# Patient Record
Sex: MALE | Race: WHITE
[De-identification: names, ages, dates, MRNs, and addresses within clinical notes are randomized per-mention and may not be internally consistent; named-entity substitution may affect disease eponyms.]

---

## 2018-03-13 ENCOUNTER — HOSPITAL ENCOUNTER (OUTPATIENT)
Dept: HOSPITAL 80 - FIMAGING | Age: 68
End: 2018-03-13
Attending: INTERNAL MEDICINE
Payer: COMMERCIAL

## 2018-03-13 DIAGNOSIS — N20.2: Primary | ICD-10-CM

## 2018-03-13 DIAGNOSIS — K57.30: ICD-10-CM

## 2018-04-20 ENCOUNTER — HOSPITAL ENCOUNTER (OUTPATIENT)
Dept: HOSPITAL 80 - FIMAGING | Age: 68
End: 2018-04-20
Attending: UROLOGY
Payer: COMMERCIAL

## 2018-04-20 DIAGNOSIS — N28.1: ICD-10-CM

## 2018-04-20 DIAGNOSIS — N20.0: Primary | ICD-10-CM

## 2018-09-05 ENCOUNTER — HOSPITAL ENCOUNTER (OUTPATIENT)
Dept: HOSPITAL 80 - FIMAGING | Age: 68
End: 2018-09-05
Attending: PSYCHIATRY & NEUROLOGY
Payer: COMMERCIAL

## 2018-09-05 DIAGNOSIS — G31.9: ICD-10-CM

## 2018-09-05 DIAGNOSIS — R90.82: Primary | ICD-10-CM

## 2018-09-28 ENCOUNTER — HOSPITAL ENCOUNTER (INPATIENT)
Dept: HOSPITAL 80 - FED | Age: 68
LOS: 2 days | Discharge: HOME | DRG: 63 | End: 2018-09-30
Attending: INTERNAL MEDICINE | Admitting: INTERNAL MEDICINE
Payer: COMMERCIAL

## 2018-09-28 DIAGNOSIS — G43.909: ICD-10-CM

## 2018-09-28 DIAGNOSIS — F41.9: ICD-10-CM

## 2018-09-28 DIAGNOSIS — R47.01: ICD-10-CM

## 2018-09-28 DIAGNOSIS — F03.90: ICD-10-CM

## 2018-09-28 DIAGNOSIS — I63.9: Primary | ICD-10-CM

## 2018-09-28 DIAGNOSIS — E86.9: ICD-10-CM

## 2018-09-28 DIAGNOSIS — N40.0: ICD-10-CM

## 2018-09-28 LAB
INR PPP: 0.93 (ref 0.83–1.16)
PLATELET # BLD: 222 10^3/UL (ref 150–400)
PROTHROMBIN TIME: 12.7 SEC (ref 12–15)

## 2018-09-28 PROCEDURE — G0378 HOSPITAL OBSERVATION PER HR: HCPCS

## 2018-09-28 RX ADMIN — OXYCODONE HYDROCHLORIDE AND ACETAMINOPHEN PRN TAB: 5; 325 TABLET ORAL at 20:56

## 2018-09-28 NOTE — ASMTLCPROG
Notes

 

Note:                         

Notes:

Spoke with patient about his anxiety. PT was manipulative talking about his anxiety and how poorly 

the [facility] has treated thim. TLC listened using a Rogerian Person centered 

approach, validated his concnerns and coached the pt with deep breathing exercises to help reduce 

anxiety as well as offered him the information to sign up with [facility] to become an open client and 

recieve counseling services. Pt was open to this, TLC dialed the screaning intake number for pt 

(since he was too far to reach the phone) and hand him the phone to set up his appt. PT was also 

given the [facility] resources sheet with the crisis lines, various centers and screening centers.

 

Date Signed:  09/28/2018 04:26 PM

Electronically Signed By:Ashwin Bearden

## 2018-09-28 NOTE — EDPHY
H & P


Stated Complaint: SOB, anxiety.


Time Seen by Provider: 09/28/18 14:56


HPI/ROS: 





CHIEF COMPLAINT:  Anxiety attack





HISTORY OF PRESENT ILLNESS:  The patient is a 68-year-old man who comes to the 

emergency department complaining of lightheadedness and anxiety attack after 

[place] [employer].  He states that he is placing a claim against 

[employer] and went to [facility] today to see if they had responded.  The 

lady who was helping him with a claim was not there.  He felt extremely 

disappointed and became lightheaded and did not feel that he could walk all the 

way back 15 blocks to his [facility] house where he is staying for history of 

substance abuse.  He reports that he has had lightheadedness off and on like 

this for several years and has had multiple cardiac workups all of which have 

been negative including recent navicular stress tests.  He also reports history 

of migraines and follows by Neurology with recent MRI which was negative.  He 

called EMS who brought him here.  A his symptoms he thinks are now resolved .


Severity:  Severe


Modifying factors  time





REVIEW OF SYSTEMS:


Constitutional:  denies: chills, fever, recent illness, recent injury


EENTM: denies: blurred vision, double vision, nose congestion


Respiratory: denies: cough, shortness of breath


Cardiac:  See HPI denies: chest pain, irregular heart rate, palpitations


Gastrointestinal/Abdominal: denies: abdominal pain, diarrhea, nausea, vomiting, 

blood streaked stools


Genitourinary: denies: dysuria, frequency, hematuria, pain


Musculoskeletal: denies: joint pain, muscle pain


Skin: denies: lesions, rash, jaundice, bruising


Neurological: denies: headache, numbness, paresthesia, tingling, dizziness, 

weakness


Hematologic/Lymphatic: denies: blood clots, easy bleeding, easy bruising


Immunologic/allergic: denies: HIV/AIDS, transplant


 10 systems reviewed and negative except as noted





EXAM:


GENERAL:  Well-appearing, well-nourished and in no acute distress.


HEAD:  Atraumatic, normocephalic.


EYES:  Pupils equal round and reactive to light, extraocular movements intact, 

sclera anicteric, conjunctiva are normal.


ENT:  TMs normal, nares patent, oropharynx clear without exudates.  Moist 

mucous membranes.


NECK:  Normal range of motion, supple without lymphadenopathy or JVD.


LUNGS:  Breath sounds clear to auscultation bilaterally and equal.  No wheezes 

rales or rhonchi.


HEART:  Regular rate and rhythm without murmurs, rubs or gallops.


ABDOMEN:  Soft, nontender, normoactive bowel sounds.  No guarding, no rebound.  

No masses appreciated. 


BACK:  No CVA tenderness, no spinal tenderness, step-offs or deformities


EXTREMITIES:  Normal range of motion, no pitting or edema.  No clubbing or 

cyanosis.


NEUROLOGICAL:  Cranial nerves II through XII grossly intact.  Normal speech, 

normal gait.  5/5 strength, normal movement in all extremities, normal sensation

, normal reflexes


PSYCH:  Normal mood, normal affect.


SKIN:  Warm, dry, normal turgor, no visible rashes or lesions.








Source: Patient


Exam Limitations: No limitations





- Personal History


Current Tetanus/Diphtheria Vaccine: Unsure


Current Tetanus Diphtheria and Acellular Pertussis (TDAP): Unsure





- Medical/Surgical History


Hx Asthma: No


Hx Chronic Respiratory Disease: No


Hx Diabetes: No


Hx Cardiac Disease: No


Hx Renal Disease: No


Hx Cirrhosis: No


Hx Alcoholism: No


Hx HIV/AIDS: No


Hx Splenectomy or Spleen Trauma: No


Other PMH: MIGRAINES, kidney stones,





- Family History


Significant Family History: No pertinent family hx





- Social History


Smoking Status: Never smoked


Alcohol Use: Sober


Drug Use: None


Constitutional: 


 Initial Vital Signs











Temperature (C)  36.6 C   09/28/18 14:41


 


Heart Rate  84   09/28/18 14:41


 


Respiratory Rate  18   09/28/18 14:41


 


Blood Pressure  151/81 H  09/28/18 14:41


 


O2 Sat (%)  97   09/28/18 14:41








 











O2 Delivery Mode               Room Air














Allergies/Adverse Reactions: 


 





morphine Allergy (Mild, Verified 09/28/18 19:18)


 Itching








Home Medications: 














 Medication  Instructions  Recorded


 


Ondansetron Odt [Zofran Odt 4 mg 8 mg PO TID PRN #10 tab 10/08/16





(*)]  


 


Sumatriptan Succinate [Imitrex] 100 mg PO DAILY PRN 09/28/18


 


Tamsulosin HCl [Flomax 0.4 MG (*)] 0.4 mg PO DAILY 09/28/18


 


oxyCODONE HCL/ACETAMINOPHEN 1 each PO QID PRN 09/28/18





[Percocet 5-325 mg Tablet]  














Medical Decision Making





- Diagnostics


EKG Interpretation: 





An EKG obtained and was read and documented in trace view.  Please see trace 

view for full reading and report.  Sinus rhythm, no acute ischemic changes 


Imaging Results: 


 Imaging Impressions





Head CT  09/28/18 16:44


Impression: No acute intracranial process.


 


Findings and recommendations discussed with CONY RODRIGUEZ  at 1658 hour, 9/28/ 2018.


 


A test result has been communicated to a licensed care provider and documented 

in the "[employer]" Critical Result system on 9/28/2018 16:59, Message ID 

[id number].








Head CTA  09/28/18 16:44


Impression: Small amount of atherosclerotic disease in the proximal right 

vertebral artery causing approximately 50% stenosis. Otherwise, normal CT 

angiogram of the head and neck.


 


Stenoses are calculated using North American Symptomatic Carotid Endarterectomy 

Trial (NASCET) criteria.


 


Findings and recommendations discussed with CONY RODRIGUEZ  at 1736 hour, 9/28/ 2018.


 


 


 








Neck CTA  09/28/18 16:44


Impression: Small amount of atherosclerotic disease in the proximal right 

vertebral artery causing approximately 50% stenosis. Otherwise, normal CT 

angiogram of the head and neck.


 


Stenoses are calculated using [employer] American Symptomatic Carotid Endarterectomy 

Trial (NASCET) criteria.


 


Findings and recommendations discussed with CONY RODRIGUEZ  at 1736 hour, 9/28/ 2018.


 


 


 











Imaging: Discussed imaging studies w/ On call Radiologist


ED Course/Re-evaluation: 





We had a long discussion with the patient about his chronic symptoms.  He is 

now asymptomatic.  I offered to perform lab work and imaging.  His EKG is 

reassuring as are his vital signs and exam.  He felt that repeat lab work was 

unlikely to be diagnostic and I agreed.  I expressed my concern that much of 

his symptoms seem to be stress related or emotionally driven.  He agreed.  He 

elected to be discharged.  He does have a  at the [facility] [place].  I 

will ask [facility] to give him resources for other therapeutic options.





4:15 p.m. [facility] has given the patient resources and he is currently on the phone 

making an appointment with them.





4:45 p.m. while being discharged the patient was displaying difficulty with word

-finding and seemed to have some confusion with following commands.  It is 

difficult to assess.  I suspect that this is part of his anxiety reaction but 

we have called a stroke alert.  I will also administer Ativan.  he is moving 

all of his extremities.  When you ask him if he thinks this is part of his 

anxiety reaction he says yes but then continues to stumble over words and 

cannot explain himself.  No slurred speech.  No facial droop.





5:40 p.m. I discussed the case with [facility].  They are also on the 

fence about whether not this represents true ischemia but do recommend giving 

tPA. We will give tPA and admit to the ICU.





6:00 p.m. I discussed the case with Dr. Stacy Pino who will admit to the 

ICU.


Differential Diagnosis: 





Partial list of the Differential diagnosis considered include but were not 

limited to;  anxiety, arrhythmia, dehydration and although unlikely based on 

the history and physical exam, I also considered acute coronary disease, CVA, 

seizure.  I discussed these differential diagnoses and the plan with the 

patient as well as the usual and expected course.  The patient understands that 

the diagnosis is provisional and that in medicine we are not always correct and 

that further workup is often warranted.  Usual and customary warnings were 

given.  All of the patient's questions were answered.  The patient was 

instructed to return to the emergency department should the symptoms at all 

worsen or return, otherwise to followup with the physician as we discussed.


Critical Care Time: 





Critical care time spent by me, Dr. Rodriguez exclusive with this patient was 45 

minutes, exclusive of the PA time exclusive of procedures.  The organ system 

that was at risk was diagnostics and I gave medications, consultation and 

admission to prevent worsening of the patient's condition





- Data Points


Laboratory Results: 


 Laboratory Results





 09/28/18 15:35 





 09/28/18 15:35 





 











  09/28/18 09/28/18 09/28/18





  16:52 15:35 15:35


 


WBC      





    


 


RBC      





    


 


Hgb      





    


 


POC Hgb  16.3 gm/dL gm/dL    





   (13.7-17.5)   


 


Hct      





    


 


POC Hct  48 % %    





   (40-51)   


 


MCV      





    


 


MCH      





    


 


MCHC      





    


 


RDW      





    


 


Plt Count      





    


 


MPV      





    


 


Neut % (Auto)      





    


 


Lymph % (Auto)      





    


 


Mono % (Auto)      





    


 


Eos % (Auto)      





    


 


Baso % (Auto)      





    


 


Nucleat RBC Rel Count      





    


 


Absolute Neuts (auto)      





    


 


Absolute Lymphs (auto)      





    


 


Absolute Monos (auto)      





    


 


Absolute Eos (auto)      





    


 


Absolute Basos (auto)      





    


 


Absolute Nucleated RBC      





    


 


Immature Gran %      





    


 


Immature Gran #      





    


 


PT      12.7 SEC SEC





     (12.0-15.0) 


 


INR      0.93 





     (0.83-1.16) 


 


APTT      27.7 SEC SEC





     (23.0-38.0) 


 


POC Sodium  143 mEq/L mEq/L    





   (135-145)   


 


Sodium    141 mEq/L mEq/L  





    (135-145)  


 


POC Potassium  3.3 mEq/L mEq/L    





   (3.3-5.0)   


 


Potassium    4.1 mEq/L mEq/L  





    (3.3-5.0)  


 


POC Chloride  106 mEq/L mEq/L    





   ()   


 


Chloride    107 mEq/L mEq/L  





    ()  


 


Carbon Dioxide    22 mEq/l mEq/l  





    (22-31)  


 


Anion Gap    12 mEq/L mEq/L  





    (8-16)  


 


POC BUN  15 mg/dL mg/dL    





   (7-23)   


 


BUN    16 mg/dL mg/dL  





    (7-23)  


 


Creatinine    1.1 mg/dL mg/dL  





    (0.7-1.3)  


 


POC Creatinine  1.0 mg/dL mg/dL    





   (0.7-1.3)   


 


Estimated GFR    > 60   





    


 


Glucose    78 mg/dL mg/dL  





    ()  


 


POC Glucose  95 mg/dL mg/dL    





   ()   


 


Calcium    9.9 mg/dL mg/dL  





    (8.5-10.4)  














  09/28/18





  15:35


 


WBC  8.95 10^3/uL 10^3/uL





   (3.80-9.50) 


 


RBC  5.27 10^6/uL 10^6/uL





   (4.40-6.38) 


 


Hgb  16.5 g/dL g/dL





   (13.7-17.5) 


 


POC Hgb  





  


 


Hct  47.2 % %





   (40.0-51.0) 


 


POC Hct  





  


 


MCV  89.6 fL fL





   (81.5-99.8) 


 


MCH  31.3 pg pg





   (27.9-34.1) 


 


MCHC  35.0 g/dL g/dL





   (32.4-36.7) 


 


RDW  12.2 % %





   (11.5-15.2) 


 


Plt Count  222 10^3/uL 10^3/uL





   (150-400) 


 


MPV  10.5 fL fL





   (8.7-11.7) 


 


Neut % (Auto)  59.4 % %





   (39.3-74.2) 


 


Lymph % (Auto)  28.2 % %





   (15.0-45.0) 


 


Mono % (Auto)  10.6 % %





   (4.5-13.0) 


 


Eos % (Auto)  0.8 % %





   (0.6-7.6) 


 


Baso % (Auto)  0.6 % %





   (0.3-1.7) 


 


Nucleat RBC Rel Count  0.0 % %





   (0.0-0.2) 


 


Absolute Neuts (auto)  5.32 10^3/uL 10^3/uL





   (1.70-6.50) 


 


Absolute Lymphs (auto)  2.52 10^3/uL 10^3/uL





   (1.00-3.00) 


 


Absolute Monos (auto)  0.95 10^3/uL H 10^3/uL





   (0.30-0.80) 


 


Absolute Eos (auto)  0.07 10^3/uL 10^3/uL





   (0.03-0.40) 


 


Absolute Basos (auto)  0.05 10^3/uL 10^3/uL





   (0.02-0.10) 


 


Absolute Nucleated RBC  0.00 10^3/uL 10^3/uL





   (0-0.01) 


 


Immature Gran %  0.4 % %





   (0.0-1.1) 


 


Immature Gran #  0.04 10^3/uL 10^3/uL





   (0.00-0.10) 


 


PT  





  


 


INR  





  


 


APTT  





  


 


POC Sodium  





  


 


Sodium  





  


 


POC Potassium  





  


 


Potassium  





  


 


POC Chloride  





  


 


Chloride  





  


 


Carbon Dioxide  





  


 


Anion Gap  





  


 


POC BUN  





  


 


BUN  





  


 


Creatinine  





  


 


POC Creatinine  





  


 


Estimated GFR  





  


 


Glucose  





  


 


POC Glucose  





  


 


Calcium  





  











Medications Given: 


 





Sodium Chloride (Ns)  1,000 mls @ 100 mls/hr IV CONT IDALIA


   Stop: 03/27/19 20:29


   Last Admin: 09/28/18 20:57 Dose:  1,000 mls


Oxycodone/Acetaminophen (Percocet 5/325)  1 tab PO QID PRN


   PRN Reason: Pain, Moderate


   Stop: 10/08/18 20:16


   Last Admin: 09/28/18 20:56 Dose:  1 tab





Discontinued Medications





Alteplase, Recombinant (Activase)  8.40591 mg 0.09 mg/kg (8.31043 mg) IV ONCE 

ONE


   PRN Reason: Protocol


   Stop: 09/28/18 17:40


   Last Admin: 09/28/18 17:51 Dose:  8.19219 mg


Alteplase, Recombinant (Activase)  78.15747 mg 0.81 mg/kg (78.03765 mg) IV ONCE 

ONE


   PRN Reason: Protocol


   Stop: 09/28/18 17:40


   Last Admin: 09/28/18 17:52 Dose:  78.55481 mg


Sodium Chloride (Ns)  1,000 mls @ 0 mls/hr IV EDNOW ONE; Wide Open


   PRN Reason: Protocol


   Stop: 09/28/18 15:03


   Last Admin: 09/28/18 15:22 Dose:  Not Given


Sodium Chloride (Ns)  50 mls @ 0 mls/hr IV EDNOW ONE


   PRN Reason: Per Protocol


   Stop: 09/28/18 17:40


   Last Admin: 09/28/18 17:53 Dose:  50 mls


Lorazepam (Ativan Injection)  0.5 mg IVP EDNOW ONE


   Stop: 09/28/18 15:03


   Last Admin: 09/28/18 15:22 Dose:  Not Given


Lorazepam (Ativan Injection)  1 mg IVP EDNOW ONE


   Stop: 09/28/18 16:46


   Last Admin: 09/28/18 17:01 Dose:  1 mg





Point of Care Test Results: 


 Chemistry











  09/28/18





  16:52


 


POC Sodium  143 mEq/L mEq/L





   (135-145) 


 


POC Potassium  3.3 mEq/L mEq/L





   (3.3-5.0) 


 


POC Chloride  106 mEq/L mEq/L





   () 


 


POC BUN  15 mg/dL mg/dL





   (7-23) 


 


POC Creatinine  1.0 mg/dL mg/dL





   (0.7-1.3) 


 


POC Glucose  95 mg/dL mg/dL





   () 








 ISTAT H&H











  09/28/18





  16:52


 


POC Hgb  16.3 gm/dL gm/dL





   (13.7-17.5) 


 


POC Hct  48 % %





   (40-51) 














Departure





- Departure


Disposition: [facility]


Clinical Impression: 


 Anxiety, Trouble with word finding





Condition: Critical

## 2018-09-28 NOTE — [facility]
DATE OF ADMISSION:  09/28/2018



CHIEF COMPLAINT:  Aphasia.



HISTORY:  Brandon is a 68-year-old male who lives in a intermediate house for a history of cocaine abuse, alt
aide he has not used for 10 months.  Today he walked 15 blocks from his intermediate house for the courth
ouse and got very lightheaded with this.  He got short of breath and the more he tried to take deep b
reaths the more short of breath he would get.  He describes himself as being extremely anxious.  He p
resented to the emergency room.  The ER physician thought this was likely anxiety related and they we
re planning to discharge him from the emergency room.  While he was getting discharge instructions he
 suddenly had the acute onset of aphasia. He had word-finding difficulty and confusion and a stroke a
lert was called.  [facility] evaluated him and recommended tPA.  The patient's word-finding d
ifficulty is now completely resolved.  The patient claims to have no memory of the event and does not
 remember having all of this occur.  He describes a history of executive cognitive function loss for 
which he is seeing a neuropsychologist, Dr. Beckham.  He states he frequently will have these transient
 episodes of aphasia related to this cognitive decline.



PAST MEDICAL HISTORY:  

1.  Migraine headaches.

2.  BPH.

3.  Kidney stones.

4.  Dementia, which he describes as executive cognitive function loss.  This was diagnosed by his joanne
ropsychologist Dr. Beckham.

5.  Anxiety.



MEDICATIONS:  Please see the computer record for a full detailed list.



ALLERGIES:  To morphine.



SOCIAL HISTORY:  Quit smoking at age 29.  Drinks very little alcohol.  In a intermediate house for history
 of cocaine abuse for which he has been clean for 10 months.  Previously worked in retail management.




REVIEW OF SYSTEMS:  Complete review of systems obtained.  Review of systems negative for constitution
al, HEENT, GI, pulmonary, cardiovascular, , hematology, skin, endocrine, psychiatric, except for po
sitives as in HPI.



FAMILY HISTORY:  Reviewed and noncontributory to presenting complaint.



PHYSICAL EXAMINATION:  GENERAL:  Well-developed, well-nourished male in no distress.  VITALS:  Temper
ature 36.5, pulse 91, blood pressure 146/90, saturating 90% on 2 L.  EYES:  Normal conjunctivae.  Pup
ils react to light.  ENT:  Normal ears and nose.  Hearing intact.  Normal teeth.  Oropharynx moist.  
NECK:  Trachea midline.  No thyromegaly.  CHEST:  Normal __________.  Chest rise bilaterally.  CARDIO
VASCULAR SYSTEM:  Regular rhythm.  No murmur.  EXTREMITIES:  No extremity edema.  ABDOMEN:  Soft, non
tender.  No hepatosplenomegaly.  SKIN:  Warm, dry, intact.  No rash.  MUSCULOSKELETAL:  No cyanosis o
r clubbing.  Strength 5/5, upper and lower extremities.  NEURO:  Cranial nerves intact.  Normal sensa
tion to light touch. PSYCHIATRIC:  Alert and oriented x3.  Normal affect.  Normal judgment.  Normal m
[place].



LABORATORIES:  White count 8.95, hematocrit 47.2, platelets 222.  Sodium 141, potassium 4.1, chloride
 107, chloride 22, BUN 16, creatinine 1.1, glucose 78.  Troponins negative.  EKG reviewed by me.  My 
personal interpretation is normal sinus rhythm, no ST-T wave changes.  Head CT is negative.  CT angio
gram of the head and neck shows 50% vertebral stenosis.  This case was discussed with Dr. Morgan, [facility] emergency room physician, regarding the ER course.



ASSESSMENT AND PLAN:  

1.  Possible stroke status post tPA, although there is some concern by the emergency room physician t
tracey this may have actually been an anxiety reaction, but [facility] recommended tPA based on their exam
ination during the event and it was administered.  According to the patient he has frequent, transien
t aphasic episodes which he attributes to this loss of executive cognitive function he has been diagn
osed with.  Will monitor in ICU with post-tPA protocol.  Check an echocardiogram and lipids.  Will co
nsult Neurology in the morning. 

2.  Anxiety.  The patient feels very strongly that much of his presentation today was an anxiety reac
tion.  He is requesting a referral to [facility]. 

3.  Dementia, being described as decreased executive cognitive functioning as discussed above.  

4.  Benign prostatic hypertrophy.  Continue Flomax.

5.  Code status, full.

6.  Admission status.  Will admit to observation.  Reevaluate tomorrow regarding the ongoing need for
 hospitalization.

7.  Deep vein thrombosis prophylaxis.  He is low risk post-tPA.





Job #:  [id number]

## 2018-09-28 NOTE — CPEKG
Test Reason : OPEN

Blood Pressure : ***/*** mmHG

Vent. Rate : 070 BPM     Atrial Rate : 071 BPM

   P-R Int : 159 ms          QRS Dur : 100 ms

    QT Int : 417 ms       P-R-T Axes : 038 018 014 degrees

   QTc Int : 450 ms

 

Sinus rhythm

 

Confirmed by Bennett Morgan ([id number]) on 9/28/2018 3:12:56 PM

 

Referred By:             Confirmed By:Bennett Morgan

## 2018-09-29 RX ADMIN — ACETAMINOPHEN SCH MG: 500 TABLET ORAL at 10:29

## 2018-09-29 RX ADMIN — ACETAMINOPHEN SCH: 500 TABLET ORAL at 17:34

## 2018-09-29 RX ADMIN — TAMSULOSIN HYDROCHLORIDE SCH MG: 0.4 CAPSULE ORAL at 10:30

## 2018-09-29 RX ADMIN — ACETAMINOPHEN SCH MG: 500 TABLET ORAL at 21:30

## 2018-09-29 NOTE — GCON
INTENSIVIST CONSULTATION



REASON FOR ADMISSION:  Possible stroke, status post tPA.  The patient is a very pleasant 68-year-old 
white male with a past medical history of dementia, anxiety, kidney stones, benign prostatic hypertro
phy, and migraine headaches.  He is a previous drug user and currently resides at a nursing home house.  H
marcellus became lightheaded and short of breath, which caused him extreme anxiety.  He was brought to the 
ergency room, where he was being discharged when he was found to have a sudden onset of aphasia and w
ord-finding difficulty.  [facility] was consulted, and they recommended tPA, which he was giv
en.  He was subsequently admitted to the intensive care unit.  His aphasia has resolved.  In discussi
on with the patient, he states overall he feels quite well.  He denies any chest pain, pleuritic-type
 chest pain, or anginal equivalent.  There is no fever or night sweats.  He denies any nausea, vomiti
ng, or diarrhea.  His headaches are improved.



PAST MEDICAL HISTORY:  Significant for anxiety, dementia, kidney stones, benign prostatic hypertrophy
, and migraines.



ALLERGIES:  Morphine.



SOCIAL HISTORY:  Previous smoker, none since age 29.  He denies any alcohol use.  He is currently in 
a nursing home house for cocaine abuse.  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  He is singl
e, without children.  He has lived in [place] for many years, is originally from [place].



FAMILY HISTORY:  Noncontributory.



REVIEW OF SYSTEMS:  A 10-point review of systems was performed and was negative except for what is li
stepasha in HPI.



PHYSICAL EXAM:  VITAL SIGNS:  Blood pressure is 101/58, pulse 72, respirations 15, temperature 37.0, 
oxygen saturation 95% on room air.  GENERAL:  He is a well-developed, well-nourished 68-year-old whit
e male, who is resting comfortably, in no acute distress.  HEENT:  Eyes PERRLA, EOMI.  Throat shows n
o erythema or tonsilar hypertrophy.  NECK:  Supple.  No cervical adenopathy.  HEART:  Regular rate an
d rhythm, without murmurs, rubs, or gallops.  LUNGS:  Diminished breath sounds, but no wheeze.  ABDOM
EN:  Soft, nontender.  Bowel sounds are present in all 4 quadrants.  EXTREMITIES:  No clubbing, cyano
sis, or edema.



LABORATORIES:  White count is 8.9, hemoglobin 16, hematocrit 47, platelet count is 222.  INR is 0.93.
  Sodium 143, potassium 3.3, chloride 106, CO2 is 22, BUN 15, creatinine 1.1, glucose is 78.



IMPRESSION:  

1.  Possible stroke, status post tissue plasminogen activator.

2.  Expressive aphasia is resolved.

3.  History of migraine headaches.

4.  Dementia.

5.  Anxiety.

6.  Benign prostatic hypertrophy.

7.  Kidney stones.



RECOMMENDATIONS:  

1.  Close neurologic and cardiovascular monitoring.

2.  DVT and PE prophylaxis.

3.  Stress ulcer prophylaxis.

4.  PT and OT.

5.  Speech.





Job #:  [id number]

## 2018-09-29 NOTE — HOSPPROG
Hospitalist Progress Note


Assessment/Plan: 





Expressive aphasia / possible CVA s/p TpA - NIHSS 0.  


   -repeat CT this evening.  If neg, start ASA 81 mg daily


   -neurology consult appreciated, doubts true ischemic event





Migraine ha - pt received benadryl and phenergan and is now resting comfortably


   -f/u with neurology for new preventive medicine





Anxiety - prn ativan while here, but will not d/c with bzd Rx due to risk of 

medication overuse


   -outpt f/u with pcp to consider initiation of ssri or other preventive 

options





Full code





Dispo - likely home in am


Subjective: Pt is resting comfortably after receiving phenergan and benadryl 

for migraine ha. No fevers/chills.  No neurologic deficits.


Objective: 


 Vital Signs











Temp Pulse Resp BP Pulse Ox


 


 37.0 C   58 L  17   109/57 L  98 


 


 09/29/18 10:00  09/29/18 12:00  09/29/18 12:00  09/29/18 12:00  09/29/18 12:00








 











 09/28/18 09/29/18 09/30/18





 05:59 05:59 05:59


 


Intake Total  2957 


 


Output Total  1150 


 


Balance  1807 








 











PT  12.7 SEC (12.0-15.0)   09/28/18  15:35    


 


INR  0.93  (0.83-1.16)   09/28/18  15:35    














- Physical Exam


Constitutional: no apparent distress


Cardiovascular: regular rate and rhythym


Respiratory: no respiratory distress


Neurologic: AAOx3





ICD10 Worksheet


Patient Problems: 


 Problems











Problem Status Onset


 


Anxiety Acute  


 


Migraine with status migrainosus Acute

## 2018-09-29 NOTE — ECHO
[email or web address]





[address] [address] 

Main: [phone] 



Fax: 



Transthoracic Echocardiogram 

Name:             EVERTON MORTON                             MR#:

[id number]

Study Date:       2018                              Study Time:

11:15 AM

YOB: 1950                              Age:

68 year(s)

Height:           180.3 cm (71 in.)                       Weight:

95.26 kg (210 lb.)

BSA:              2.15 m2                                 Gender:

Male

Examination:      Echo                                    Indication:

Ischemic Stroke

Image Quality:    Adequate                                Contrast: 

Requested by:     Stacy Pino                          BP:

105 mmHg/51 mmHg

Heart Rate:                                               Rhythm: 

Indication:       Ischemic Stroke 



Procedure Staff 

Ultrasound Technician:   Tiffanie Arceo [facility] 

Reading Physician:       Fawn Carr MD 

Requesting Provider: 



Conclusions:           Normal size left ventricle.  

Mild concentric LV hypertrophy.  

Normal global systolic LV function.  

The ejection fraction is visually estimated to be 55 %.  

No regional wall motion abnormality.  

Normal diastolic LV function.  

Mildly dilated right ventricle.  

Mildly reduced RV function.  

An agitated saline study was performed and was negative for

intracardiac shunting.

The right atrium is mildly dilated.  

Mild mitral valve regurgitation is present.  

Mild tricuspid regurgitation is present.  

Right ventricular systolic pressure measures 37mmHg.  

No pericardial effusion.  

There is no previous echocardiogram for comparison. 

No obvious cardiac source of embolism seen on this study. TERE is more

sensitive to detect cardiac

source of embolism. 



Measurements: 

Chambers                    Valvular Assessment AV/MV

Valvular Assessment TV/PV



Normal                                    Normal

Normal

Name         Value     Range              Name         Value Range

Name           Value Range

Ao Kelsey (2D): 3.1 cm    (1.4 cm-2.6            AV Vmax:     1.20 m/s (1

m/s-1.7        TR Vmax:       2.81 mm/s ( - )



cm)                                   m/s)             TR PGmax:

32 mmHg ( - )

IVSd (2D):   1.1 cm (0.6 cm-1.1               AV maxP mmHg ( -

)           syst. PAP: 37 mmHg  ( - )



cm)                AV meanP mmHg ( - )           PV Vmax:

1.32 m/s (0.6 m/s-0.9

LVDd (2D):   5.1 cm    (4.2 cm-5.9            MARQUITA (VTI):   2.7 cm ( -

)                               m/s)



cm)                MV E Vmax:   0.58 m/s ( - )         PV PGmax:

7  mmHg ( - )

LVDs (2D):   3.3 cm    (2.1 cm-4              MV A Vmax:   0.62 m/s (

- )



cm)                MV E/A:      0.94  ( - )  



Patient: EVERTON MORTON                          MRN: [id number]

Study Date: 2018   Page 1 of 3

11:15 AM 









LVPWd (2D): 1.1 cm (0.6 cm-1 MV PHT: 0.058 s ( - )  

cm)                MVA (PHT):   3.8 s ( - )  



LVOTd        2.1 cm    2.1 cm mm 

LVEF (BP):   52 %      (>=55 %)   

Visual EF:   55 % 

RVDd(2D):    3.8 cm    (1.9 cm-3.8 



cmmm)  



Continued Measurements: 

Chambers                      Valvular Assessment AV/MV

Valvular Assessment TV/PV



Name                       Value          Name

Value    Name                      Value

LADs:                    3.8 cm               MV DecTime:

194 m/s      CVP (est.):              5 mmHg

LADs Lon.2 cm               MV E' Septal:

0.07  m/s

LA Area:                 17.1 cm2         MV E/E' Septal:        8.50



LA Volume:               50 ml            MV E/E' Lateral:       5.30



LA Volume Index:         23.3 ml/m2   

RA Area:                 20.2 cm2   



Additional Vessels  



Name                       Value  

Ao Ascending:            3.2 cm    



Findings:              Left Ventricle: 

Normal size left ventricle. Mild concentric LV hypertrophy. Normal

global systolic LV function. The

ejection fraction is visually estimated to be 55 %. No regional wall

motion abnormality. Normal

diastolic LV function.  

Right Ventricle: 

Mildly dilated right ventricle. Mildly reduced RV function.  

Left Atrium: 

The left atrium is normal in size. An agitated saline study was

performed and was negative for

intracardiac shunting.  

Right Atrium: 

The right atrium is mildly dilated.  

Mitral Valve: 

The mitral valve is normal in appearance and function. Mild mitral

valve regurgitation is present. No

mitral stenosis is present.  

Aortic Valve: 

The aortic valve is tri-leaflet. There is no significant aortic valve

regurgitation. No aortic valve

stenosis is present.  

Tricuspid Valve: 

The tricuspid valve is normal in appearance and function. Mild

tricuspid regurgitation is present. The

pulmonary artery pressure is normal. Right ventricular systolic

pressure measures 37mmHg.

Pulmonic Valve: 

The pulmonic valve is normal in appearance and function.  

Aorta: 

The aorta is normal. Normal size aortic root measuring 3.1 cm. Normal

size ascending aorta

measuring 3.2 cm.  

IVC: 

Difficult visualization of subcostals.  

Pericardium: 

No pericardial effusion. There is pericardial fat. No pleural

effusion.







Electronically signed by Fawn Carr MD on 2018 at 01:34 PM 

(No Signature Object) 



Patient: EVERTON MORTON                          MRN: [id number]

Study Date: 2018   Page 2 of 3

11:15 AM 









Patient: EVERTON MORTON MRN: [id number] Study Date: 2018 Page 3 of

3

11:15 AM 







[id number]

## 2018-09-29 NOTE — ASMTCMCOM
CM Note

 

CM Note                       

Notes:

I attempted to meet with patient various times, but he was sleeping. Per chart review, it seems 

that he's living at the [facility]. In the ED, he was supported by [facility] 

regarding his desire to connect with [facility]. Case Management will follow up with 

him to see if he was able to schedule an intake.



Patient is on bedrest until tonight; tomorrow PT/OT will evaluate him. I imagine he will be cleared 


and may discharge independently back to the long-term [place]. Case Management will follow. 

 

Date Signed:  09/29/2018 02:54 PM

Electronically Signed By:Vonda Orozco RN

## 2018-09-29 NOTE — GHP
DATE OF ADMISSION:  09/28/2018



HISTORY:  The patient is a 68-year-old gentleman who I have known for many years in the office since 
2011.  He has a 30 year history of intractable headaches and has been on all standard migraine prophy
lactic agents over the years without sustained benefit and has had overuse of sumatriptan for many ye
ars, but it has been the strategy that has given him reasonable relief of headaches.  Yesterday, [place] and started experiencing some lightheadedness and anxiety and says that he
 was not sure what was happening, but came to the hospital for evaluation.  He was thought to be havi
ng probable anxiety or panic attack and was going to be discharged when he became aphasic which led t
o a stroke alert considerations and tPA via [facility].  His CT angiogram was unremarkabl
e for any large vessel stenosis or embolus evidence and CT was unremarkable as well.  We have followe
d him over the years with cognitive questions and repeat neuropsych testing has shown some mild cogni
tive impairment or normal aging with complex disease related to his medications and/or history of sub
stance abuse.  He has been sober for many months now from cocaine use.  The patient does feel that an
xiety is the main problem.  He has had resolution of his symptoms back to his baseline and does not h
ave any complaints this morning other than some headache.



PAST MEDICAL HISTORY:  Kidney stone.  Some mild cognitive impairment but not dementia.  Anxiety.  His
tory of cocaine abuse.



ALLERGIES:  Allergy to morphine.



SOCIAL HISTORY:  No smoking since age 29.  Occasional alcohol.  Currently in a detention house with his
tory of cocaine abuse and reports sobriety over 10 months.



MEDICATIONS:  Prior to coming to the hospital, his medications were tamsulosin, sumatriptan which he 
takes as many as 18 per month, Zofran 4 mg as needed, and oxycodone as well as some Xanax.  I reviewe
d the [place] PDMP and he had refills of oxycodone in May with 20 tablets as well as receiving some 
Xanax.  This was through either Dr. Cohen or Dr. Emma Cee at the [facility].
  In reviewing his narcotic usage over the last year, he has had 95 tablets prescribed since October 2017.



REVIEW OF SYSTEMS:  Negative for chest pain, palpitation, shortness of breath.



PHYSICAL EXAMINATION:  VITAL SIGNS:  Blood pressure 117/76.  Pulse of 61.  Respirations 14.  Temperat
ure is 36.9.  GENERAL:  He is well developed, no acute distress.  EYES:  Clear.  NECK:  Supple.  No b
ruits or masses.  CARDIAC:  Regular rate and rhythm.  No murmur.  NEUROLOGIC:  He is awake, alert, at
tentive, and oriented to person, place, and time.  He can answer questions and follow commands and ha
s no aphasia.  Pupils 1 mm and reactive.  Extraocular movements intact.  No visual field loss.  Maggie
l facial sensation and strength.  Palate elevates symmetrically.  Tongue protrudes midline.  Motor ex
am, normal muscle bulk and tone with 5/5 strength and no abnormal movements.  Sensation is preserved 
for temperature and light touch.  Reflexes 1+. 



NIH Stroke Scale is 0.



IMPRESSION:  Total unit time of 55 minutes.  The patient has experienced an episode of aphasia in the
 setting of further profound anxiety.  He has a long history of migraine and analgesic overuse with t
riptans, but this has been something that has been as a last resort because of failure of so many oth
er medications.  Precisely what happened is not clear.  I do not think he actually had a true ischemi
c event, but might be related to anxiety or panic as well as migraine equivalent phenomena.  In any c
ase, he did receive tPA and is back to his baseline and will need 24 hours in the ICU without antipla
telet therapy and then can be placed on 1 aspirin per day.  We have recently tried to start him on 
movig (new medication for migraine prophylaxis) and he is awaiting the arrival of that medication at 
home soon.  He needs to get back into regular therapy.  He says his current psychotherapist is estephanie santos but he is going to try to find someone with [facility].  With regard to treatme
nt of the pain, he cannot get antiinflammatories in the first 24 hours after tPA, so he could still h
ave a few doses of oxycodone as needed, and I will also put an order for IV Benadryl and Phenergan.  
He has high risk for inappropriate use of medicine, although there is not any overt evidence of misus
e of the narcotics.  I would not recommend use of the narcotics as an outpatient for headaches.  If h
e needs benzodiazepines for anxiety, that should be administered under the care of a psychiatrist danyell cr with very close followup.  He has an LDL cholesterol level of 63.  I do not recommend we put him
 on a statin at this point, given the fact that I do not think this was a TIA or stroke.  Echocardiog
jordan is pending.  We will continue to follow up with him as an outpatient.





Job #:  [id number]

## 2018-09-30 VITALS — DIASTOLIC BLOOD PRESSURE: 69 MMHG | SYSTOLIC BLOOD PRESSURE: 122 MMHG

## 2018-09-30 RX ADMIN — TAMSULOSIN HYDROCHLORIDE SCH MG: 0.4 CAPSULE ORAL at 08:38

## 2018-09-30 RX ADMIN — ACETAMINOPHEN SCH: 500 TABLET ORAL at 08:40

## 2018-09-30 RX ADMIN — OXYCODONE HYDROCHLORIDE AND ACETAMINOPHEN PRN TAB: 5; 325 TABLET ORAL at 08:39

## 2018-09-30 NOTE — PDMN
Medical Necessity


Medical necessity: Change to IP, as of 9/29/18, per MD; los >2 mn for ongoing 

management of possible CVA w/expressive aphasia s/p TPA; requiring further ICU 

monitoring, bed rest, repeat CT, med management & therapies; hx cognitive 

impairment, anxiety, migraines

## 2018-09-30 NOTE — PDINTPN
Intensivist Progress Note


Assessment/Plan: 


Assessment:


* Migraines


* Possible stroke


* Status post tPA


* Anxiety


* Depression


* History of substance abuse























Plan:


Continue present care


Likely discharge for home soon





Subjective: 





Sitting up in chair.  Resting comfortably.  Headaches reasonably well 

controlled.


Objective: 





 Vital Signs











Temp Pulse Resp BP Pulse Ox


 


 36.4 C   72   14   121/68 H  94 


 


 09/30/18 07:45  09/30/18 07:45  09/30/18 07:45  09/30/18 07:45  09/30/18 07:45








 











 09/29/18 09/30/18 10/01/18





 05:59 05:59 05:59


 


Intake Total  1000 


 


Output Total  1700 


 


Balance  -700 








 











PT  12.7 SEC (12.0-15.0)   09/28/18  15:35    


 


INR  0.93  (0.83-1.16)   09/28/18  15:35    














- Time Spent With Patient


Time Spent With Patient: 





25 min of time spent with patient, over 1/2 involved with coordination of care 

or counseling.





Physical Exam





- Physical Exam


General Appearance: WD/WN, alert


EENT: PERRL/EOMI, normal ENT inspection, pharynx normal, TMs normal


Neck: non-tender, full range of motion, supple, normal inspection


Respiratory: chest non-tender, lungs clear, normal breath sounds


Cardiac/Chest: normal peripheral pulses


Peripheral Pulses: 2+: carotid (R), carotid (L), femoral (R), femoral (L), 

dorsalis-pedis (R), dorsalis-pedis (L)


Abdomen: normal bowel sounds, non-tender, soft


Male Genitalia: deferred


Rectal: deferred


Skin: normal color, warm/dry


Extremities: normal range of motion, non-tender, normal inspection, normal 

capillary refill


Neuro/Psych: no motor/sensory deficits, alert, normal mood/affect, oriented x 3





ICD10 Worksheet


Patient Problems: 


 Problems











Problem Status Onset


 


Anxiety Acute  


 


Migraine with status migrainosus Acute

## 2018-09-30 NOTE — ASMTLACE
LACE

 

Length of stay for            Answers:  2 days                                

current admission                                                             

Acuity / Level of             Answers:  Yes                                   

Care: Did the patient                                                         

have an inpatient                                                             

admission?                                                                    

Comorbidities - select        Answers:  Cerebrovascular disease               

all that apply                          (CVA, TIA, aneurysms, vasc            

                                        ular dementia)                        

# of Emergency department     Answers:  1-2                                   

visits in the last 6                                                          

months                                                                        

Social determinants           Answers:  Mental health diagnosis               

                                        (anxiety, depression, pers            

                                        onality disorders, etc.)              

                                        Lack of community                     

                                        resources and/or lack of              

                                        social support (no                    

                                        pcp, lives                            

                                        alone, transportation, angélica            

                                        d)                                    

Score: 14

 

Date Signed:  09/30/2018 10:57 AM

Electronically Signed By:Surekha Fall LCSW

## 2018-09-30 NOTE — ASMTDCNOTE
Case Management Discharge

 

Discharge Order Complete?     Answers:  Yes                                   

Patient to Obtain             Answers:  Independently                         

Medications                                                                   

Transportation Arranged       Answers:  Bus Tokens                            

Transport will Pick (Date     09/30/2018 12:00 PM

& Time)                       

Discharge Comments            

Notes:

Patient has been discharged. He was given a bus token for transport.

 

Date Signed:  09/30/2018 10:54 AM

Electronically Signed By:Surekha Fall LCSW

## 2018-09-30 NOTE — ASDISCHSUM
----------------------------------------------

Discharge Information

----------------------------------------------

Plan Status:Home with No Needs                       Medically Cleared to Leave:09/30/2018

Discharge Date:09/30/2018                            CM D/C Disposition:Home, Routine, Self-Care

ADT D/C Disposition:Home, Routine, Self-Care         Projected Discharge Date:09/30/2018 12:00 PM

Transportation at D/C:Bus Ticket                     Discharge Delay Reason:

Follow-Up Date:09/30/2018 12:00 PM                   Discharge Slot:

Final Diagnosis:Migraines, Possible stroke, Anxiety/Depression

----------------------------------------------

Placement Information

----------------------------------------------

----------------------------------------------

Patient Contact Information

----------------------------------------------

Contact Name:TEE                               Relationship:

Address:[address]                             Home Phone:

                                                     Work [phone]

City:[place]                                   Alternate Phone:

[facility]/Zip Code:[place] [address]                              Email:

----------------------------------------------

Financial Information

----------------------------------------------

Financial Class:Medicare

Primary Plan Desc:MEDICARE OUTPATIENT                Primary Plan Number:[id number]

Secondary Plan Desc:[employer]                           Secondary Plan Number:[id number]

 

 

----------------------------------------------

Assessment Information

----------------------------------------------

----------------------------------------------

TLC Progress Note

----------------------------------------------

Notes

 

Note:                         

Notes:

Spoke with patient about his anxiety. PT was manipulative talking about his anxiety and how poorly 

the [facility] has treated thim. RALEIGH listened using a Rogerian Person centered 

approach, validated his concnerns and coached the pt with deep breathing exercises to help reduce 

anxiety as well as offered him the information to sign up with [facility] to become an open client and 

recieve counseling services. Pt was open to this, [facility] dialed the screaning intake number for pt 

(since he was too far to reach the phone) and hand him the phone to set up his appt. PT was also 

given the [facility] resources sheet with the crisis lines, various centers and screening centers.

 

Date Signed:  09/28/2018 04:26 PM

Electronically Signed By:Ashwin Bearden

 

 

----------------------------------------------

[facility] CM Progress Note

----------------------------------------------

CM Note

 

CM Note                       

Notes:

I attempted to meet with patient various times, but he was sleeping. Per chart review, it seems 

that he's living at the [facility]. In the ED, he was supported by [facility] 

regarding his desire to connect with [facility]. Case Management will follow up with 

him to see if he was able to schedule an intake.



Patient is on bedrest until tonight; tomorrow PT/OT will evaluate him. I imagine he will be cleared 


and may discharge independently back to the [facility]. Case Management will follow. 

 

Date Signed:  09/29/2018 02:54 PM

Electronically Signed By:Vonda Orozco RN

 

 

----------------------------------------------

LACE

----------------------------------------------

LACE

 

Length of stay for            Answers:  2 days                                

current admission                                                             

Acuity / Level of             Answers:  Yes                                   

Care: Did the patient                                                         

have an inpatient                                                             

admission?                                                                    

Comorbidities - select        Answers:  Cerebrovascular disease               

all that apply                          (CVA, TIA, aneurysms, vasc            

                                        ular dementia)                        

# of Emergency department     Answers:  1-2                                   

visits in the last 6                                                          

months                                                                        

Social determinants           Answers:  Mental health diagnosis               

                                        (anxiety, depression, pers            

                                        onality disorders, etc.)              

                                        Lack of community                     

                                        resources and/or lack of              

                                        social support (no                    

                                        pcp, lives                            

                                        alone, transportation, angélica            

                                        d)                                    

Score: 14

 

Date Signed:  09/30/2018 10:57 AM

Electronically Signed By:Surekha Fall LCSW

 

 

----------------------------------------------

Case Management Discharge Plan Note

----------------------------------------------

Case Management Discharge

 

Discharge Order Complete?     Answers:  Yes                                   

Patient to Obtain             Answers:  Independently                         

Medications                                                                   

Transportation Arranged       Answers:  Bus Tokens                            

Transport will Pick (Date     09/30/2018 12:00 PM

& Time)                       

Discharge Comments            

Notes:

Patient has been discharged. He was given a bus token for transport.

 

Date Signed:  09/30/2018 10:54 AM

Electronically Signed By:Surekha Fall LCSW

 

 

----------------------------------------------

Intervention Information

----------------------------------------------

## 2018-10-03 ENCOUNTER — HOSPITAL ENCOUNTER (OUTPATIENT)
Dept: HOSPITAL 80 - BMCIMAGING | Age: 68
Discharge: HOME | End: 2018-10-03
Attending: UROLOGY
Payer: COMMERCIAL

## 2018-10-03 DIAGNOSIS — R97.20: Primary | ICD-10-CM

## 2018-10-04 NOTE — GDS
DISCHARGE DIAGNOSES:  

1.  Expressive aphasia, possibly secondary to cerebrovascular accident, status post tissue plasminoge
n activator.

2.  Migraine headaches.

3.  Anxiety.



CONSULTANTS:  

1.  Dr. Andrew Livingston, intensivist.

2.  Dr. Dante Aguayo, Neurology.



HISTORY:  For details, please see dictated history and physical dated September 28, 2018.  In brief, 
the patient is a 68-year-old male with a history of migraine headaches and anxiety who presented to [facility] emergency department with aphasia.  He was evaluated by [facility] and decision was made t
o treat him with tPA.  He was thus admitted to the intensive care unit for further management.



HOSPITAL COURSE:  The patient admitted to ICU.  He received tPA in the emergency department and the [facility] 24 hours, tPA protocol was followed.  A repeat head CT 24 hours post tPA was negative.  He w
as evaluated by Neurology and ultimately given his history, this was thought to be less likely second
rashad to stroke and may have been an anxiety reaction.  His symptoms completely resolved.  He will have
 close followup with his primary care neurologist for ongoing management of his migraine headaches an
d anxiety. 



It is recommended he start a daily aspirin 81 mg; however, this is deferred until he has a discussion
 with his urologist as he apparently has a prostate biopsy scheduled for later this week; therefore, 
he will not start the aspirin until it is determined when he will undergo the biopsy.  He can start a
spirin after that pending clearance by Urology postprocedure.



DISPOSITION:  Patient is discharged home in stable condition.



FOLLOWUP:  

1.  Dr. Jose De Jesus Cohen, primary care.

2.  Dr. Dante Aguayo, Neurology.



DISCHARGE MEDICATIONS:  Please see [facility] for completed outpatient medication list. 



New medications on discharge, include aspirin 81 mg p.o. daily to be started after his prostate biops
y.  He will continue all other outpatient medications as previously prescribed. 



Discontinued medication:  Sumatriptan is discontinued for now given his admission for possible cerebr
ovascular accident as this can increase the risk of cardiovascular event.  Will have ongoing discussi
ons with his neurologist regarding further migraine headache management.





Job #:  [id number]

## 2019-01-31 ENCOUNTER — HOSPITAL ENCOUNTER (EMERGENCY)
Dept: HOSPITAL 80 - FED | Age: 69
Discharge: HOME | End: 2019-01-31
Payer: COMMERCIAL

## 2019-01-31 VITALS — SYSTOLIC BLOOD PRESSURE: 153 MMHG | DIASTOLIC BLOOD PRESSURE: 83 MMHG

## 2019-01-31 DIAGNOSIS — S60.412A: Primary | ICD-10-CM

## 2019-01-31 DIAGNOSIS — Z23: ICD-10-CM

## 2019-01-31 NOTE — EDPHY
H & P


Smoking Status: Never smoked


Time Seen by Provider: 01/31/19 12:16


HPI/ROS: 





CHIEF COMPLAINT:  Abrasion right middle digit





HISTORY OF PRESENT ILLNESS:  68-year-old male sustained accidental abrasion is 

right middle digit in the ER to have tetanus update.  Denies pain or 

discomfort.  Denies discoloration.  Occurred yesterday.











************


PHYSICAL EXAM





(Prior to examination, patient consented to physical exam, hands were washed 

and my usual and customary physical exam procedures followed)


1) GENERAL: Well-developed, well-nourished, alert and oriented.  Appears to be 

in no acute distress.


2) HEAD: Normocephalic


3) HEENT: sclera anicteric   


4) LUNGS: Breathing comfortably.   


5) SKIN:  Right middle digit distal phalanx palmar aspect minimally appreciable 

abrasion with no signs of infection.  Negative Kanavel.  No erythema.  Full pain

-free range of motion.   


 (ЕЛЕНА Olson)


Constitutional: 





 Initial Vital Signs











Temperature (C)  36.3 C   01/31/19 11:30


 


Heart Rate  72   01/31/19 11:30


 


Respiratory Rate  18   01/31/19 11:30


 


Blood Pressure  153/83 H  01/31/19 11:30


 


O2 Sat (%)  93   01/31/19 11:30








 











O2 Delivery Mode               Room Air














Allergies/Adverse Reactions: 


 





morphine Allergy (Mild, Verified 01/31/19 11:32)


 Itching








Home Medications: 














 Medication  Instructions  Recorded


 


Ondansetron Odt [Zofran Odt 4 mg 8 mg PO TID PRN #10 tab 10/08/16





(*)]  


 


Tamsulosin HCl [Flomax 0.4 MG (*)] 0.4 mg PO DAILY 09/28/18


 


oxyCODONE HCL/ACETAMINOPHEN 1 each PO QID PRN 09/28/18





[Percocet 5-325 mg Tablet]  


 


Aspirin EC [Aspirin EC 81 mg (*)] 81 mg PO DAILY #30 tab 09/30/18














MDM/Departure





- MDM


Medications Given: 





 








Discontinued Medications





Diphtheria/Tetanus/Acell Pertussis (Boostrix)  0.5 ml IM .ONCE ONE


   Stop: 01/31/19 12:17


   Last Admin: 01/31/19 12:19 Dose:  0.5 ml





ED Course/Re-evaluation: 





The patient's tetanus has been updated.  He has no signs of infection.  

Negative kanavel sign.  Given my usual and customary wound precautions 

instructions. (ЕЛЕНА Olson)





The patient was evaluated and managed by the Physician Assistant.    My co-

signature indicates that I have reviewed this chart and I agree with the 

findings and plan of care as documented.  I am the secondary supervising 

physician. (Lamar Tay)





- Depart


Disposition: Home, Routine, Self-Care


Clinical Impression: 


Abrasion of finger


Qualifiers:


 Encounter type: initial encounter Qualified Code(s): S60.419A - Abrasion of 

unspecified finger, initial encounter





Condition: Good


Instructions:  Abrasion (ED)


Referrals: 


DESTINY Gresham MD [Medical Doctor] - 1-2 days without fail

## 2019-06-24 ENCOUNTER — HOSPITAL ENCOUNTER (EMERGENCY)
Dept: HOSPITAL 80 - FED | Age: 69
Discharge: HOME | End: 2019-06-24
Payer: COMMERCIAL